# Patient Record
Sex: FEMALE | Race: BLACK OR AFRICAN AMERICAN | NOT HISPANIC OR LATINO | URBAN - METROPOLITAN AREA
[De-identification: names, ages, dates, MRNs, and addresses within clinical notes are randomized per-mention and may not be internally consistent; named-entity substitution may affect disease eponyms.]

---

## 2019-08-25 ENCOUNTER — EMERGENCY (EMERGENCY)
Facility: HOSPITAL | Age: 54
LOS: 1 days | Discharge: HOME | End: 2019-08-25
Attending: EMERGENCY MEDICINE
Payer: COMMERCIAL

## 2019-08-25 VITALS
WEIGHT: 190.04 LBS | TEMPERATURE: 98 F | OXYGEN SATURATION: 96 % | HEART RATE: 87 BPM | RESPIRATION RATE: 20 BRPM | HEIGHT: 66 IN | SYSTOLIC BLOOD PRESSURE: 153 MMHG | DIASTOLIC BLOOD PRESSURE: 95 MMHG

## 2019-08-25 PROCEDURE — 72100 X-RAY EXAM L-S SPINE 2/3 VWS: CPT

## 2019-08-25 PROCEDURE — 99283 EMERGENCY DEPT VISIT LOW MDM: CPT

## 2019-08-25 PROCEDURE — 71046 X-RAY EXAM CHEST 2 VIEWS: CPT

## 2019-08-25 PROCEDURE — 71046 X-RAY EXAM CHEST 2 VIEWS: CPT | Mod: 26

## 2019-08-25 PROCEDURE — 73564 X-RAY EXAM KNEE 4 OR MORE: CPT

## 2019-08-25 PROCEDURE — 72100 X-RAY EXAM L-S SPINE 2/3 VWS: CPT | Mod: 26

## 2019-08-25 PROCEDURE — 99285 EMERGENCY DEPT VISIT HI MDM: CPT | Mod: 25

## 2019-08-25 PROCEDURE — 73564 X-RAY EXAM KNEE 4 OR MORE: CPT | Mod: 26,LT

## 2019-08-25 RX ORDER — ONDANSETRON 8 MG/1
4 TABLET, FILM COATED ORAL ONCE
Refills: 0 | Status: DISCONTINUED | OUTPATIENT
Start: 2019-08-25 | End: 2019-08-31

## 2019-08-25 RX ORDER — ACETAMINOPHEN 500 MG
975 TABLET ORAL ONCE
Refills: 0 | Status: COMPLETED | OUTPATIENT
Start: 2019-08-25 | End: 2019-08-25

## 2019-08-25 RX ADMIN — Medication 975 MILLIGRAM(S): at 18:06

## 2019-08-25 RX ADMIN — Medication 975 MILLIGRAM(S): at 17:26
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
## 2021-01-18 NOTE — ED PROVIDER NOTE - CPE EDP CARDIAC NORM
normal... Quality 265: Biopsy Follow-Up: Biopsy results reviewed, communicated, tracked, and documented Detail Level: Zone

## 2022-12-14 NOTE — ED PROVIDER NOTE - NSFOLLOWUPINSTRUCTIONS_ED_ALL_ED_FT
n/a Rest, increase activity as tolerated  Your pain will most likely get worse tomorrow before it gets better and this is normal  REturn to the ER for uncontrolled pain, shortness of breath, dizziness, chest pains or any other concerns  Take tylenol for pain as needed every 4-6 hrs  Ice packs to all sore areas as needed 20 min at a time.

## 2023-10-16 NOTE — ED PROVIDER NOTE - NSTIMEPROVIDERCAREINITIATE_GEN_ER
25-Aug-2019 16:32 Low Dose Naltrexone Pregnancy And Lactation Text: Naltrexone is pregnancy category C.  There have been no adequate and well-controlled studies in pregnant women.  It should be used in pregnancy only if the potential benefit justifies the potential risk to the fetus.   Limited data indicates that naltrexone is minimally excreted into breastmilk.
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
## 2024-10-18 NOTE — ED ADULT NURSE NOTE - NSFALLRSKINDICATORS_ED_ALL_ED
----- Message from Sarah NINO sent at 10/18/2024  1:34 PM CDT -----  Patient Name: Leigh Wick    Specialist or PCP Name: Halina Murillo    Symptoms: Current headache (5/6), blood pressure has been fluctuating throughout the day, 10/18/24 @8:30 185/85 last read just before call approx 1:30pm 154/84, neck pain and stiffness in rear (3)    Pregnant (females aged 13-60. If Yes, how long?) : n/a    Call Back # : [phone]    Which State are you currently located in?: [place]    Name of Clinic Site / Acct# : [id number]     Call arrived during: Work Hours     no